# Patient Record
Sex: FEMALE | Race: WHITE
[De-identification: names, ages, dates, MRNs, and addresses within clinical notes are randomized per-mention and may not be internally consistent; named-entity substitution may affect disease eponyms.]

---

## 2018-01-23 ENCOUNTER — HOSPITAL ENCOUNTER (OUTPATIENT)
Dept: HOSPITAL 88 - DX | Age: 58
End: 2018-01-23
Attending: INTERNAL MEDICINE
Payer: COMMERCIAL

## 2018-01-23 DIAGNOSIS — M81.0: Primary | ICD-10-CM

## 2018-01-23 PROCEDURE — 77080 DXA BONE DENSITY AXIAL: CPT

## 2018-01-23 NOTE — DIAGNOSTIC IMAGING REPORT
EXAM:  DXA BONE DENSITY

INDICATIONS: AGE RELATED OSTEOPORSIS

COMPARISON: None.

 

FINDINGS:

Proximal left femur neck bone mineral density (BMD) (g/cm2):0.537

Femur T-score (standard deviation relative to young adult mean BMD):

-2.8

Femur Z-score (standard deviation relative to age-matched control 

group):-1.7

 

Proximal left femur total bone mineral density (BMD) (g/cm2):0.652

Femur T-score (standard deviation relative to young adult mean BMD):

-2.4

Femur Z-score (standard deviation relative to age-matched control 

group):-1.6

 

Lumbar bone mineral density (BMD) (g/cm2):0.860

Lumbar T-score (standard deviation relative to young adult mean BMD):

-1.7

Lumbar Z-score (standard deviation relative to age-matched control 

group):-0.5

 

Change since prior exam (%):

Femur:Not applicable.

Spine:Not applicable.

Change since oldest prior exam (%):

Femur:Not applicable.

Spine:Not applicable.

 

CONCLUSION:

1. Bone mineral density in the left femur is classified as 

osteoporosis. Fracture risk is high.

2. Bone mineral density in the spine is classified as osteopenia. 

Fracture risk is moderate.

 

World Health Organization Classification:

*The Z-score is provided for informational purposes. The T-score is 

preferable for clinical decisions.  When comparing exams, a change of 

>4% is considered statistically significant.

SUGGESTED RECOMMENDATIONS:

Normal \T\ Osteopenia:Consideration should be given to use of calcium 

supplementation, daily multiple vitamins and adequate exercise, as 

preventive measures against osteoporosis, if clinically indicated.

Osteoporosis \T\ Severe Osteoporosis:In addition to the above, 

consideration should be given to medical therapy against osteoporosis, 

if clinically indicated.

 

 

Dictated by:  Jean-Pierre Aguilar M.D. on 1/23/2018 at 16:37     

Electronically approved by:  Jean-Pierre Aguilar M.D. on 1/23/2018 at 16:37

## 2021-09-26 ENCOUNTER — HOSPITAL ENCOUNTER (EMERGENCY)
Dept: HOSPITAL 88 - ER | Age: 61
Discharge: HOME | End: 2021-09-26
Payer: COMMERCIAL

## 2021-09-26 VITALS — WEIGHT: 125 LBS | HEIGHT: 66 IN | BODY MASS INDEX: 20.09 KG/M2

## 2021-09-26 DIAGNOSIS — E03.9: ICD-10-CM

## 2021-09-26 DIAGNOSIS — U07.1: Primary | ICD-10-CM

## 2021-09-26 PROCEDURE — 99284 EMERGENCY DEPT VISIT MOD MDM: CPT

## 2024-02-09 ENCOUNTER — APPOINTMENT (RX ONLY)
Dept: URBAN - NONMETROPOLITAN AREA CLINIC 41 | Facility: CLINIC | Age: 64
Setting detail: DERMATOLOGY
End: 2024-02-09

## 2024-02-09 VITALS — WEIGHT: 120 LBS | HEIGHT: 66 IN

## 2024-02-09 DIAGNOSIS — L60.3 NAIL DYSTROPHY: ICD-10-CM

## 2024-02-09 DIAGNOSIS — L57.8 OTHER SKIN CHANGES DUE TO CHRONIC EXPOSURE TO NONIONIZING RADIATION: ICD-10-CM

## 2024-02-09 PROCEDURE — ? COUNSELING

## 2024-02-09 PROCEDURE — ? DIAGNOSIS COMMENT

## 2024-02-09 PROCEDURE — 99203 OFFICE O/P NEW LOW 30 MIN: CPT

## 2024-02-09 ASSESSMENT — LOCATION ZONE DERM
LOCATION ZONE: ARM
LOCATION ZONE: ARM
LOCATION ZONE: TOENAIL
LOCATION ZONE: TOENAIL

## 2024-02-09 ASSESSMENT — LOCATION SIMPLE DESCRIPTION DERM
LOCATION SIMPLE: LEFT GREAT TOE
LOCATION SIMPLE: LEFT FOREARM
LOCATION SIMPLE: LEFT GREAT TOE
LOCATION SIMPLE: LEFT FOREARM

## 2024-02-09 ASSESSMENT — LOCATION DETAILED DESCRIPTION DERM
LOCATION DETAILED: LEFT DISTAL DORSAL FOREARM
LOCATION DETAILED: LEFT GREAT TOENAIL
LOCATION DETAILED: LEFT GREAT TOENAIL
LOCATION DETAILED: LEFT DISTAL DORSAL FOREARM

## 2024-02-09 NOTE — PROCEDURE: COUNSELING
Gloves Recommendations: Will get nail biopsy on the next visit.
Detail Level: Detailed
Detail Level: Zone

## 2024-02-09 NOTE — PROCEDURE: DIAGNOSIS COMMENT
Detail Level: Simple
Comment: Resulted from trauma about 25 years ago. 2/3 of the distal nail bed lifted and well healed. No signs of Pseudomonas and fungus.
Render Risk Assessment In Note?: no

## 2024-02-09 NOTE — HPI: SKIN LESION
How Severe Is Your Skin Lesion?: moderate
Has Your Skin Lesion Been Treated?: not been treated
Is This A New Presentation, Or A Follow-Up?: Skin Lesion
Which Family Member (Optional)?: Mother sister